# Patient Record
Sex: FEMALE | Race: WHITE | URBAN - METROPOLITAN AREA
[De-identification: names, ages, dates, MRNs, and addresses within clinical notes are randomized per-mention and may not be internally consistent; named-entity substitution may affect disease eponyms.]

---

## 2022-08-10 LAB
ALBUMIN SERPL-MCNC: 3.9 G/DL
ALP BLD-CCNC: 122 U/L
ALT SERPL-CCNC: 24 U/L
ANION GAP SERPL CALCULATED.3IONS-SCNC: NORMAL MMOL/L
AST SERPL-CCNC: 23 U/L
BASOPHILS ABSOLUTE: 76 /ΜL
BASOPHILS RELATIVE PERCENT: 0.8 %
BILIRUB SERPL-MCNC: 0.7 MG/DL (ref 0.1–1.4)
BILIRUBIN, URINE: NEGATIVE
BILIRUBIN, URINE: NORMAL
BLOOD, URINE: NORMAL
BLOOD, URINE: POSITIVE
BUN BLDV-MCNC: 12 MG/DL
CALCIUM SERPL-MCNC: 9 MG/DL
CHLORIDE BLD-SCNC: 105 MMOL/L
CHOLESTEROL, TOTAL: 169 MG/DL
CHOLESTEROL/HDL RATIO: 4
CLARITY: CLEAR
CLARITY: NORMAL
CO2: 23 MMOL/L
COLOR: NORMAL
COLOR: YELLOW
CREAT SERPL-MCNC: 0.62 MG/DL
EOSINOPHILS ABSOLUTE: 228 /ΜL
EOSINOPHILS RELATIVE PERCENT: 2.4 %
GFR CALCULATED: 113
GLUCOSE BLD-MCNC: 118 MG/DL
GLUCOSE URINE: ABNORMAL
GLUCOSE URINE: NORMAL
HCT VFR BLD CALC: 29 % (ref 36–46)
HDLC SERPL-MCNC: 42 MG/DL (ref 35–70)
HEMOGLOBIN: 7.2 G/DL (ref 12–16)
KETONES, URINE: NEGATIVE
KETONES, URINE: NORMAL
LDL CHOLESTEROL CALCULATED: 108 MG/DL (ref 0–160)
LEUKOCYTE ESTERASE, URINE: NEGATIVE
LEUKOCYTE ESTERASE, URINE: NORMAL
LYMPHOCYTES ABSOLUTE: 2708 /ΜL
LYMPHOCYTES RELATIVE PERCENT: 28.5 %
MCH RBC QN AUTO: 16.4 PG
MCHC RBC AUTO-ENTMCNC: 24.8 G/DL
MCV RBC AUTO: 66.1 FL
MONOCYTES ABSOLUTE: 580 /ΜL
MONOCYTES RELATIVE PERCENT: 6.1 %
NEUTROPHILS ABSOLUTE: 5909 /ΜL
NEUTROPHILS RELATIVE PERCENT: 62.2 %
NITRITE, URINE: NEGATIVE
NITRITE, URINE: NORMAL
NONHDLC SERPL-MCNC: 127 MG/DL
PH UA: 5.5 (ref 4.5–8)
PH UA: NORMAL
PLATELET # BLD: 279 K/ΜL
PMV BLD AUTO: 20.4 FL
POTASSIUM SERPL-SCNC: 4 MMOL/L
PROTEIN UA: ABNORMAL
PROTEIN UA: NORMAL
RBC # BLD: 4.39 10^6/ΜL
SARS-COV-2: <1
SODIUM BLD-SCNC: 138 MMOL/L
SPECIFIC GRAVITY, URINE: 1.02
SPECIFIC GRAVITY, URINE: NORMAL
TOTAL PROTEIN: 6.8
TRIGL SERPL-MCNC: 101 MG/DL
TSH SERPL DL<=0.05 MIU/L-ACNC: 12.31 UIU/ML
UROBILINOGEN, URINE: ABNORMAL
UROBILINOGEN, URINE: NORMAL
VLDLC SERPL CALC-MCNC: NORMAL MG/DL
WBC # BLD: 9.5 10^3/ML

## 2022-08-29 ENCOUNTER — OFFICE VISIT (OUTPATIENT)
Dept: PRIMARY CARE CLINIC | Age: 42
End: 2022-08-29
Payer: COMMERCIAL

## 2022-08-29 VITALS
HEART RATE: 100 BPM | BODY MASS INDEX: 44.41 KG/M2 | SYSTOLIC BLOOD PRESSURE: 124 MMHG | OXYGEN SATURATION: 99 % | HEIGHT: 68 IN | DIASTOLIC BLOOD PRESSURE: 86 MMHG | WEIGHT: 293 LBS

## 2022-08-29 DIAGNOSIS — Z98.84 HX OF GASTRIC BYPASS: ICD-10-CM

## 2022-08-29 DIAGNOSIS — E66.01 CLASS 3 SEVERE OBESITY DUE TO EXCESS CALORIES WITH SERIOUS COMORBIDITY AND BODY MASS INDEX (BMI) OF 50.0 TO 59.9 IN ADULT (HCC): ICD-10-CM

## 2022-08-29 DIAGNOSIS — N92.1 MENORRHAGIA WITH IRREGULAR CYCLE: Primary | ICD-10-CM

## 2022-08-29 DIAGNOSIS — E03.9 ACQUIRED HYPOTHYROIDISM: ICD-10-CM

## 2022-08-29 DIAGNOSIS — R22.43 LOCALIZED SWELLING OF BOTH LOWER LEGS: ICD-10-CM

## 2022-08-29 PROCEDURE — 99203 OFFICE O/P NEW LOW 30 MIN: CPT | Performed by: NURSE PRACTITIONER

## 2022-08-29 PROCEDURE — 36415 COLL VENOUS BLD VENIPUNCTURE: CPT | Performed by: NURSE PRACTITIONER

## 2022-08-29 RX ORDER — POTASSIUM CHLORIDE 750 MG/1
10 TABLET, EXTENDED RELEASE ORAL DAILY
Qty: 90 TABLET | Refills: 1 | Status: SHIPPED | OUTPATIENT
Start: 2022-08-29

## 2022-08-29 RX ORDER — LEVOTHYROXINE SODIUM 0.1 MG/1
100 TABLET ORAL DAILY
COMMUNITY
End: 2022-08-30

## 2022-08-29 RX ORDER — FERROUS SULFATE 325(65) MG
325 TABLET ORAL
COMMUNITY

## 2022-08-29 RX ORDER — FUROSEMIDE 20 MG/1
20 TABLET ORAL DAILY
Qty: 90 TABLET | Refills: 0 | Status: SHIPPED | OUTPATIENT
Start: 2022-08-29

## 2022-08-29 SDOH — ECONOMIC STABILITY: FOOD INSECURITY: WITHIN THE PAST 12 MONTHS, YOU WORRIED THAT YOUR FOOD WOULD RUN OUT BEFORE YOU GOT MONEY TO BUY MORE.: NEVER TRUE

## 2022-08-29 SDOH — HEALTH STABILITY: PHYSICAL HEALTH: ON AVERAGE, HOW MANY DAYS PER WEEK DO YOU ENGAGE IN MODERATE TO STRENUOUS EXERCISE (LIKE A BRISK WALK)?: 0 DAYS

## 2022-08-29 SDOH — ECONOMIC STABILITY: FOOD INSECURITY: WITHIN THE PAST 12 MONTHS, THE FOOD YOU BOUGHT JUST DIDN'T LAST AND YOU DIDN'T HAVE MONEY TO GET MORE.: NEVER TRUE

## 2022-08-29 ASSESSMENT — PATIENT HEALTH QUESTIONNAIRE - PHQ9
8. MOVING OR SPEAKING SO SLOWLY THAT OTHER PEOPLE COULD HAVE NOTICED. OR THE OPPOSITE, BEING SO FIGETY OR RESTLESS THAT YOU HAVE BEEN MOVING AROUND A LOT MORE THAN USUAL: 0
SUM OF ALL RESPONSES TO PHQ9 QUESTIONS 1 & 2: 2
SUM OF ALL RESPONSES TO PHQ QUESTIONS 1-9: 12
2. FEELING DOWN, DEPRESSED OR HOPELESS: 1
5. POOR APPETITE OR OVEREATING: 3
SUM OF ALL RESPONSES TO PHQ QUESTIONS 1-9: 12
4. FEELING TIRED OR HAVING LITTLE ENERGY: 3
1. LITTLE INTEREST OR PLEASURE IN DOING THINGS: 1
SUM OF ALL RESPONSES TO PHQ QUESTIONS 1-9: 12
3. TROUBLE FALLING OR STAYING ASLEEP: 2
9. THOUGHTS THAT YOU WOULD BE BETTER OFF DEAD, OR OF HURTING YOURSELF: 0
10. IF YOU CHECKED OFF ANY PROBLEMS, HOW DIFFICULT HAVE THESE PROBLEMS MADE IT FOR YOU TO DO YOUR WORK, TAKE CARE OF THINGS AT HOME, OR GET ALONG WITH OTHER PEOPLE: 3
7. TROUBLE CONCENTRATING ON THINGS, SUCH AS READING THE NEWSPAPER OR WATCHING TELEVISION: 2
6. FEELING BAD ABOUT YOURSELF - OR THAT YOU ARE A FAILURE OR HAVE LET YOURSELF OR YOUR FAMILY DOWN: 0
SUM OF ALL RESPONSES TO PHQ QUESTIONS 1-9: 12

## 2022-08-29 ASSESSMENT — SOCIAL DETERMINANTS OF HEALTH (SDOH)
WITHIN THE LAST YEAR, HAVE YOU BEEN AFRAID OF YOUR PARTNER OR EX-PARTNER?: NO
HOW HARD IS IT FOR YOU TO PAY FOR THE VERY BASICS LIKE FOOD, HOUSING, MEDICAL CARE, AND HEATING?: NOT HARD AT ALL
WITHIN THE LAST YEAR, HAVE TO BEEN RAPED OR FORCED TO HAVE ANY KIND OF SEXUAL ACTIVITY BY YOUR PARTNER OR EX-PARTNER?: NO
WITHIN THE LAST YEAR, HAVE YOU BEEN KICKED, HIT, SLAPPED, OR OTHERWISE PHYSICALLY HURT BY YOUR PARTNER OR EX-PARTNER?: NO
WITHIN THE LAST YEAR, HAVE YOU BEEN HUMILIATED OR EMOTIONALLY ABUSED IN OTHER WAYS BY YOUR PARTNER OR EX-PARTNER?: NO

## 2022-08-29 ASSESSMENT — ENCOUNTER SYMPTOMS
SHORTNESS OF BREATH: 1
COUGH: 0
WHEEZING: 0

## 2022-08-29 NOTE — PROGRESS NOTES
PROGRESS NOTE  Date of Service:  8/29/2022  Address: [place] [address] [facility]   [address], [address]  Dept: [phone]  Loc: [phone]    Subjective:      Patient ID: [id number]  Dmitry Edwards is a 38 y.o. female    HPI: patient is her to establish care, patient is  she works for [employer]. 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 Patient has 4 kids, patient has twins, 15, 6, 8. Patient was treated at [facility] for anemia, patient has had this in the past, patient does have heavy periods. Patient said that she thought she had covid. Patient has been having on going shortness of breath, at least amonth, patient was concerned she had covid. She had negative, she said she does not matter what she does. Patient does feel swollen. Patient is not eating out, she does not have appetite. Patient has echo done in 2019. Patient said that it was ok will get records, 2014 it was normal.     Review of Systems   Constitutional:  Negative for chills, fatigue and fever. Respiratory:  Positive for shortness of breath. Negative for cough and wheezing. All other systems reviewed and are negative. Objective:   Physical Exam  Vitals reviewed. Constitutional:       Appearance: Normal appearance. Cardiovascular:      Rate and Rhythm: Normal rate and regular rhythm. Pulses: Normal pulses. Heart sounds: Normal heart sounds. Pulmonary:      Effort: Pulmonary effort is normal.      Breath sounds: Normal breath sounds. Skin:     Capillary Refill: Capillary refill takes less than 2 seconds. Neurological:      General: No focal deficit present. Mental Status: She is alert and oriented to person, place, and time. Psychiatric:         Mood and Affect: Mood normal.         Behavior: Behavior normal.         Thought Content:  Thought content normal.         Judgment: Judgment normal.       Plan:   1. Menorrhagia with irregular cycle patient been having irregular periods. Patient says she is never had regular periods  -     AFL - Felisha Mirza MD, Gynecology, [facility]  2. Acquired hypothyroidism patient has history of hypothyroid we will check thyroid levels today.  -     TSH with Reflex  3. Hx of gastric bypass patient has a history of gastric bypass has not had vitamin levels checked will check today. -     Vitamin D 25 Hydroxy  4. Class 3 severe obesity due to excess calories with serious comorbidity and body mass index (BMI) of 50.0 to 59.9 in [facility]) patient is due for A1c and CMP. We will check these levels. We will talk to patient next office visit about counting calories. -     Hemoglobin A1C  -     Comprehensive Metabolic Panel  5. Localized swelling of both lower legs patient's been having swelling in both legs we will try Lasix we will get blood work before her starting Lasix patient agrees with plan. -     furosemide (LASIX) 20 MG tablet; Take 1 tablet by mouth daily, Disp-90 tablet, R-0Normal  -     potassium chloride (KLOR-CON M) 10 MEQ extended release tablet; Take 1 tablet by mouth daily, Disp-90 tablet, R-1Normal           Electronically signed by GRAEME Hamilton CNP on 8/29/22 at 3:00 PM EDT     This dictation was generated by voice recognition computer software. Although all attempts are made to edit the dictation for accuracy, there may be errors in the transcription that were not intended.

## 2022-08-30 DIAGNOSIS — E55.9 VITAMIN D DEFICIENCY: ICD-10-CM

## 2022-08-30 DIAGNOSIS — E03.9 ACQUIRED HYPOTHYROIDISM: Primary | ICD-10-CM

## 2022-08-30 LAB
A/G RATIO: 1.5 (ref 1.1–2.2)
ALBUMIN SERPL-MCNC: 4.2 G/DL (ref 3.4–5)
ALP BLD-CCNC: 144 U/L (ref 40–129)
ALT SERPL-CCNC: 28 U/L (ref 10–40)
ANION GAP SERPL CALCULATED.3IONS-SCNC: 15 MMOL/L (ref 3–16)
AST SERPL-CCNC: 30 U/L (ref 15–37)
BILIRUB SERPL-MCNC: 0.6 MG/DL (ref 0–1)
BUN BLDV-MCNC: 8 MG/DL (ref 7–20)
CALCIUM SERPL-MCNC: 8.7 MG/DL (ref 8.3–10.6)
CHLORIDE BLD-SCNC: 106 MMOL/L (ref 99–110)
CO2: 21 MMOL/L (ref 21–32)
CREAT SERPL-MCNC: 0.7 MG/DL (ref 0.6–1.1)
ESTIMATED AVERAGE GLUCOSE: 99.7 MG/DL
GFR AFRICAN AMERICAN: >60
GFR NON-AFRICAN AMERICAN: >60
GLUCOSE BLD-MCNC: 98 MG/DL (ref 70–99)
HBA1C MFR BLD: 5.1 %
POTASSIUM SERPL-SCNC: 4.3 MMOL/L (ref 3.5–5.1)
SODIUM BLD-SCNC: 142 MMOL/L (ref 136–145)
T4 FREE: 1 NG/DL (ref 0.9–1.8)
TOTAL PROTEIN: 7 G/DL (ref 6.4–8.2)
TSH REFLEX: 13.35 UIU/ML (ref 0.27–4.2)
VITAMIN D 25-HYDROXY: 15 NG/ML

## 2022-08-30 RX ORDER — LEVOTHYROXINE SODIUM 137 UG/1
TABLET ORAL
Qty: 180 TABLET | Refills: 0 | Status: SHIPPED | OUTPATIENT
Start: 2022-08-30 | End: 2022-10-05

## 2022-08-30 RX ORDER — ERGOCALCIFEROL 1.25 MG/1
50000 CAPSULE ORAL WEEKLY
Qty: 12 CAPSULE | Refills: 1 | Status: SHIPPED | OUTPATIENT
Start: 2022-08-30

## 2022-09-26 ENCOUNTER — OFFICE VISIT (OUTPATIENT)
Dept: PRIMARY CARE CLINIC | Age: 42
End: 2022-09-26
Payer: COMMERCIAL

## 2022-09-26 VITALS
TEMPERATURE: 98 F | RESPIRATION RATE: 18 BRPM | WEIGHT: 293 LBS | HEART RATE: 86 BPM | SYSTOLIC BLOOD PRESSURE: 116 MMHG | OXYGEN SATURATION: 98 % | BODY MASS INDEX: 44.41 KG/M2 | DIASTOLIC BLOOD PRESSURE: 82 MMHG | HEIGHT: 68 IN

## 2022-09-26 DIAGNOSIS — D50.0 IRON DEFICIENCY ANEMIA DUE TO CHRONIC BLOOD LOSS: Primary | ICD-10-CM

## 2022-09-26 DIAGNOSIS — E03.9 ACQUIRED HYPOTHYROIDISM: ICD-10-CM

## 2022-09-26 DIAGNOSIS — R60.0 BILATERAL LEG EDEMA: ICD-10-CM

## 2022-09-26 DIAGNOSIS — E55.9 VITAMIN D DEFICIENCY: ICD-10-CM

## 2022-09-26 DIAGNOSIS — Z98.84 HX OF GASTRIC BYPASS: ICD-10-CM

## 2022-09-26 DIAGNOSIS — E66.01 CLASS 3 SEVERE OBESITY DUE TO EXCESS CALORIES WITH SERIOUS COMORBIDITY AND BODY MASS INDEX (BMI) OF 50.0 TO 59.9 IN ADULT (HCC): ICD-10-CM

## 2022-09-26 LAB
ANION GAP SERPL CALCULATED.3IONS-SCNC: 16 MMOL/L (ref 3–16)
BUN BLDV-MCNC: 7 MG/DL (ref 7–20)
CALCIUM SERPL-MCNC: 9.5 MG/DL (ref 8.3–10.6)
CHLORIDE BLD-SCNC: 105 MMOL/L (ref 99–110)
CO2: 21 MMOL/L (ref 21–32)
CREAT SERPL-MCNC: 0.7 MG/DL (ref 0.6–1.1)
FOLATE: 11.59 NG/ML (ref 4.78–24.2)
GFR AFRICAN AMERICAN: >60
GFR NON-AFRICAN AMERICAN: >60
GLUCOSE BLD-MCNC: 121 MG/DL (ref 70–99)
POTASSIUM SERPL-SCNC: 4.4 MMOL/L (ref 3.5–5.1)
SODIUM BLD-SCNC: 142 MMOL/L (ref 136–145)
TSH SERPL DL<=0.05 MIU/L-ACNC: 0.25 UIU/ML (ref 0.27–4.2)
VITAMIN B-12: >2000 PG/ML (ref 211–911)
VITAMIN D 25-HYDROXY: 25 NG/ML

## 2022-09-26 PROCEDURE — 99214 OFFICE O/P EST MOD 30 MIN: CPT | Performed by: NURSE PRACTITIONER

## 2022-09-26 PROCEDURE — 36415 COLL VENOUS BLD VENIPUNCTURE: CPT | Performed by: NURSE PRACTITIONER

## 2022-09-26 ASSESSMENT — LIFESTYLE VARIABLES
HOW MANY STANDARD DRINKS CONTAINING ALCOHOL DO YOU HAVE ON A TYPICAL DAY: 1 OR 2
HOW OFTEN DO YOU HAVE A DRINK CONTAINING ALCOHOL: MONTHLY OR LESS

## 2022-09-26 ASSESSMENT — PATIENT HEALTH QUESTIONNAIRE - PHQ9
SUM OF ALL RESPONSES TO PHQ9 QUESTIONS 1 & 2: 0
SUM OF ALL RESPONSES TO PHQ QUESTIONS 1-9: 0
1. LITTLE INTEREST OR PLEASURE IN DOING THINGS: 0
SUM OF ALL RESPONSES TO PHQ QUESTIONS 1-9: 0
2. FEELING DOWN, DEPRESSED OR HOPELESS: 0

## 2022-09-26 ASSESSMENT — ENCOUNTER SYMPTOMS
DIARRHEA: 0
SHORTNESS OF BREATH: 0
WHEEZING: 0
BLOOD IN STOOL: 0
NAUSEA: 0
CONSTIPATION: 0
COUGH: 0
VOMITING: 0

## 2022-09-26 NOTE — PROGRESS NOTES
Vitamin B12 & Folate  -     CBC with Auto Differential  -     Celiac Disease Panel  2. Bilateral leg edema patient's leg edema has improved with Lasix. Patient will do for 1 more month and then stop and see how her leg edema is doing patient can increase her physical activity to help with venous insufficiency. -     Basic Metabolic Panel  3. Vitamin D deficiency patient is a history of vitamin D deficiency we will check labs. -     Vitamin D 25 Hydroxy  4. Hx of gastric bypass patient had gastric bypass surgery we will check B12.  -     Vitamin B12 & Folate  5. Class 3 severe obesity due to excess calories with serious comorbidity and body mass index (BMI) of 50.0 to 59.9 in adult (HCC)-we will discuss with patient next office visit about calorie counting. Weight loss would improve patient's swelling in her legs. 6. Acquired hypothyroidism patient is a history of hypothyroid we will check levels patient was off last time.  -     TSH           Electronically signed by GRAEME Barker CNP on 9/26/22 at 11:38 AM EDT     This dictation was generated by voice recognition computer software. Although all attempts are made to edit the dictation for accuracy, there may be errors in the transcription that were not intended.

## 2022-09-26 NOTE — PATIENT INSTRUCTIONS
[address]  Flavio Waters MD   [address], [address] [place] [address]   Phone: [phone] Fax: [phone]

## 2022-09-27 LAB
IGA: 160 MG/DL (ref 70–400)
TISSUE TRANSGLUTAMINASE IGA: 0.7 U/ML (ref 0–14)

## 2022-09-28 DIAGNOSIS — D50.0 IRON DEFICIENCY ANEMIA DUE TO CHRONIC BLOOD LOSS: ICD-10-CM

## 2022-09-28 DIAGNOSIS — E03.9 ACQUIRED HYPOTHYROIDISM: Primary | ICD-10-CM

## 2022-09-28 LAB
IRON SATURATION: 10 % (ref 15–50)
IRON: 34 UG/DL (ref 37–145)
TOTAL IRON BINDING CAPACITY: 341 UG/DL (ref 260–445)

## 2022-10-05 ENCOUNTER — TELEPHONE (OUTPATIENT)
Dept: PRIMARY CARE CLINIC | Age: 42
End: 2022-10-05

## 2022-10-05 RX ORDER — LEVOTHYROXINE SODIUM 0.07 MG/1
225 TABLET ORAL DAILY
Qty: 90 TABLET | Refills: 0 | Status: SHIPPED | OUTPATIENT
Start: 2022-10-05

## 2022-10-05 NOTE — TELEPHONE ENCOUNTER
Sent in 75 mg tablets for patient and she will take 3 of these.   She will reach get it rechecked again in 4 weeks

## 2022-10-05 NOTE — TELEPHONE ENCOUNTER
Patient called due to thyroid being hyperactive, new dosing of synthroid needs to be sent per results note. Last dosing of 137mcg BID sent 08/30. Routing to [employer].

## 2022-11-09 ENCOUNTER — NURSE ONLY (OUTPATIENT)
Dept: PRIMARY CARE CLINIC | Age: 42
End: 2022-11-09
Payer: COMMERCIAL

## 2022-11-09 VITALS — TEMPERATURE: 98 F

## 2022-11-09 DIAGNOSIS — D50.0 IRON DEFICIENCY ANEMIA DUE TO CHRONIC BLOOD LOSS: ICD-10-CM

## 2022-11-09 DIAGNOSIS — E03.9 ACQUIRED HYPOTHYROIDISM: ICD-10-CM

## 2022-11-09 PROCEDURE — 36415 COLL VENOUS BLD VENIPUNCTURE: CPT | Performed by: NURSE PRACTITIONER

## 2022-11-10 ENCOUNTER — TELEPHONE (OUTPATIENT)
Dept: PRIMARY CARE CLINIC | Age: 42
End: 2022-11-10

## 2022-11-10 DIAGNOSIS — D50.0 IRON DEFICIENCY ANEMIA DUE TO CHRONIC BLOOD LOSS: Primary | ICD-10-CM

## 2022-11-10 LAB
REASON FOR REJECTION: NORMAL
REJECTED TEST: NORMAL
TSH SERPL DL<=0.05 MIU/L-ACNC: 1.04 UIU/ML (ref 0.27–4.2)

## 2022-11-14 RX ORDER — LEVOTHYROXINE SODIUM 0.07 MG/1
225 TABLET ORAL DAILY
Qty: 90 TABLET | Refills: 0 | Status: SHIPPED | OUTPATIENT
Start: 2022-11-14

## 2023-01-05 DIAGNOSIS — R22.43 LOCALIZED SWELLING OF BOTH LOWER LEGS: ICD-10-CM

## 2023-01-05 RX ORDER — LEVOTHYROXINE SODIUM 0.07 MG/1
225 TABLET ORAL DAILY
Qty: 90 TABLET | Refills: 0 | Status: SHIPPED | OUTPATIENT
Start: 2023-01-05

## 2023-01-05 RX ORDER — FUROSEMIDE 20 MG/1
20 TABLET ORAL DAILY
Qty: 90 TABLET | Refills: 0 | Status: SHIPPED | OUTPATIENT
Start: 2023-01-05

## 2023-01-05 NOTE — TELEPHONE ENCOUNTER
Medication:   Requested Prescriptions     Pending Prescriptions Disp Refills    furosemide (LASIX) 20 MG tablet [Pharmacy Med Name: FUROSEMIDE 20MG TABLETS] 90 tablet 0     Sig: TAKE 1 TABLET BY MOUTH DAILY    levothyroxine (SYNTHROID) 75 MCG tablet [Pharmacy Med Name: LEVOTHYROXINE 0.075MG (75MCG) TABS] 90 tablet 0     Sig: TAKE 3 TABLETS BY MOUTH DAILY        Last [id number]      Patient Phone Number: [phone] (home)     Last appt: 9/26/2022   Next appt: Visit date not found    Last OARRS: No flowsheet data found.

## 2023-01-06 NOTE — TELEPHONE ENCOUNTER
LM for patient to call for appointment per [facility]- letter sent in mail as well.

## 2023-05-11 RX ORDER — LEVOTHYROXINE SODIUM 0.07 MG/1
225 TABLET ORAL DAILY
Qty: 30 TABLET | Refills: 0 | Status: SHIPPED | OUTPATIENT
Start: 2023-05-11

## 2023-06-29 ENCOUNTER — TELEPHONE (OUTPATIENT)
Dept: PRIMARY CARE CLINIC | Age: 43
End: 2023-06-29

## 2023-06-29 ENCOUNTER — TELEMEDICINE (OUTPATIENT)
Dept: PRIMARY CARE CLINIC | Age: 43
End: 2023-06-29
Payer: COMMERCIAL

## 2023-06-29 DIAGNOSIS — U07.1 COVID-19: Primary | ICD-10-CM

## 2023-06-29 PROCEDURE — 99213 OFFICE O/P EST LOW 20 MIN: CPT | Performed by: NURSE PRACTITIONER

## 2023-06-29 RX ORDER — NIRMATRELVIR AND RITONAVIR 150-100 MG
KIT ORAL
Qty: 20 TABLET | Refills: 0 | Status: SHIPPED | OUTPATIENT
Start: 2023-06-29 | End: 2023-06-29

## 2023-06-29 SDOH — ECONOMIC STABILITY: INCOME INSECURITY: HOW HARD IS IT FOR YOU TO PAY FOR THE VERY BASICS LIKE FOOD, HOUSING, MEDICAL CARE, AND HEATING?: NOT HARD AT ALL

## 2023-06-29 SDOH — ECONOMIC STABILITY: FOOD INSECURITY: WITHIN THE PAST 12 MONTHS, YOU WORRIED THAT YOUR FOOD WOULD RUN OUT BEFORE YOU GOT MONEY TO BUY MORE.: NEVER TRUE

## 2023-06-29 SDOH — ECONOMIC STABILITY: FOOD INSECURITY: WITHIN THE PAST 12 MONTHS, THE FOOD YOU BOUGHT JUST DIDN'T LAST AND YOU DIDN'T HAVE MONEY TO GET MORE.: NEVER TRUE

## 2023-06-29 SDOH — ECONOMIC STABILITY: HOUSING INSECURITY
IN THE LAST 12 MONTHS, WAS THERE A TIME WHEN YOU DID NOT HAVE A STEADY PLACE TO SLEEP OR SLEPT IN A SHELTER (INCLUDING NOW)?: NO

## 2023-06-29 ASSESSMENT — PATIENT HEALTH QUESTIONNAIRE - PHQ9
SUM OF ALL RESPONSES TO PHQ9 QUESTIONS 1 & 2: 0
SUM OF ALL RESPONSES TO PHQ QUESTIONS 1-9: 0
SUM OF ALL RESPONSES TO PHQ QUESTIONS 1-9: 0
2. FEELING DOWN, DEPRESSED OR HOPELESS: 0
1. LITTLE INTEREST OR PLEASURE IN DOING THINGS: 0
SUM OF ALL RESPONSES TO PHQ QUESTIONS 1-9: 0
SUM OF ALL RESPONSES TO PHQ QUESTIONS 1-9: 0

## 2023-06-29 ASSESSMENT — ENCOUNTER SYMPTOMS
WHEEZING: 0
SHORTNESS OF BREATH: 1
COUGH: 1

## 2023-09-08 DIAGNOSIS — R22.43 LOCALIZED SWELLING OF BOTH LOWER LEGS: ICD-10-CM

## 2023-09-08 RX ORDER — POTASSIUM CHLORIDE 750 MG/1
10 TABLET, EXTENDED RELEASE ORAL DAILY
Qty: 90 TABLET | Refills: 1 | OUTPATIENT
Start: 2023-09-08

## 2023-09-08 NOTE — TELEPHONE ENCOUNTER
Medication:   Requested Prescriptions     Pending Prescriptions Disp Refills    potassium chloride (KLOR-CON M) 10 MEQ extended release tablet [Pharmacy Med Name: POTASSIUM CL MICRO 10MEQ ER TABS] 90 tablet 1     Sig: TAKE 1 TABLET BY MOUTH DAILY        Last Filled:  8/29/2022 *d/c'd on 6/29/2023*    Patient Phone Number: [phone] (home)     Last appt: 9/26/2022 Return in about 3 months (around 12/26/2022) for legs swelling and anemia . Next appt: Visit date not found    Last OARRS: No flowsheet data found.

## 2024-01-29 ENCOUNTER — TELEPHONE (OUTPATIENT)
Dept: PRIMARY CARE CLINIC | Age: 44
End: 2024-01-29

## 2024-01-29 NOTE — TELEPHONE ENCOUNTER
Lab Orders Requested  In advance of scheduled OV 2/2/2024  *Please follow up with patient if/when Orders are available

## 2024-01-29 NOTE — TELEPHONE ENCOUNTER
Since patient is in the afternoon yes I can order labs but let her know if something comes up in the appointment might need to get them done again. Or she can wait until after appointment to get them?

## 2024-02-02 ENCOUNTER — OFFICE VISIT (OUTPATIENT)
Dept: PRIMARY CARE CLINIC | Age: 44
End: 2024-02-02
Payer: COMMERCIAL

## 2024-02-02 VITALS
DIASTOLIC BLOOD PRESSURE: 86 MMHG | BODY MASS INDEX: 44.41 KG/M2 | OXYGEN SATURATION: 100 % | HEART RATE: 95 BPM | HEIGHT: 68 IN | RESPIRATION RATE: 18 BRPM | SYSTOLIC BLOOD PRESSURE: 132 MMHG | TEMPERATURE: 97.4 F | WEIGHT: 293 LBS

## 2024-02-02 DIAGNOSIS — R22.32 LOCALIZED SWELLING OF BOTH HANDS: ICD-10-CM

## 2024-02-02 DIAGNOSIS — D50.0 IRON DEFICIENCY ANEMIA DUE TO CHRONIC BLOOD LOSS: ICD-10-CM

## 2024-02-02 DIAGNOSIS — R06.01 ORTHOPNEA: ICD-10-CM

## 2024-02-02 DIAGNOSIS — E03.9 ACQUIRED HYPOTHYROIDISM: ICD-10-CM

## 2024-02-02 DIAGNOSIS — R22.31 LOCALIZED SWELLING OF BOTH HANDS: ICD-10-CM

## 2024-02-02 DIAGNOSIS — M79.89 LEG SWELLING: Primary | ICD-10-CM

## 2024-02-02 DIAGNOSIS — E55.9 VITAMIN D DEFICIENCY: ICD-10-CM

## 2024-02-02 DIAGNOSIS — R06.02 SOB (SHORTNESS OF BREATH) ON EXERTION: ICD-10-CM

## 2024-02-02 PROCEDURE — 99214 OFFICE O/P EST MOD 30 MIN: CPT | Performed by: NURSE PRACTITIONER

## 2024-02-02 RX ORDER — LEVOTHYROXINE SODIUM 0.03 MG/1
25 TABLET ORAL DAILY
Qty: 90 TABLET | Refills: 1 | Status: SHIPPED | OUTPATIENT
Start: 2024-02-02

## 2024-02-02 RX ORDER — FUROSEMIDE 20 MG/1
20 TABLET ORAL DAILY
Qty: 90 TABLET | Refills: 1 | Status: SHIPPED | OUTPATIENT
Start: 2024-02-02

## 2024-02-02 RX ORDER — POTASSIUM CHLORIDE 20 MEQ/1
20 TABLET, EXTENDED RELEASE ORAL DAILY
Qty: 90 TABLET | Refills: 1 | Status: SHIPPED | OUTPATIENT
Start: 2024-02-02

## 2024-02-02 ASSESSMENT — PATIENT HEALTH QUESTIONNAIRE - PHQ9
2. FEELING DOWN, DEPRESSED OR HOPELESS: SEVERAL DAYS
7. TROUBLE CONCENTRATING ON THINGS, SUCH AS READING THE NEWSPAPER OR WATCHING TELEVISION: 1
SUM OF ALL RESPONSES TO PHQ QUESTIONS 1-9: 6
4. FEELING TIRED OR HAVING LITTLE ENERGY: 1
1. LITTLE INTEREST OR PLEASURE IN DOING THINGS: SEVERAL DAYS
10. IF YOU CHECKED OFF ANY PROBLEMS, HOW DIFFICULT HAVE THESE PROBLEMS MADE IT FOR YOU TO DO YOUR WORK, TAKE CARE OF THINGS AT HOME, OR GET ALONG WITH OTHER PEOPLE: 1
SUM OF ALL RESPONSES TO PHQ9 QUESTIONS 1 & 2: 2
1. LITTLE INTEREST OR PLEASURE IN DOING THINGS: 1
SUM OF ALL RESPONSES TO PHQ QUESTIONS 1-9: 6
6. FEELING BAD ABOUT YOURSELF - OR THAT YOU ARE A FAILURE OR HAVE LET YOURSELF OR YOUR FAMILY DOWN: 0
SUM OF ALL RESPONSES TO PHQ9 QUESTIONS 1 & 2: 2
SUM OF ALL RESPONSES TO PHQ QUESTIONS 1-9: 6
9. THOUGHTS THAT YOU WOULD BE BETTER OFF DEAD, OR OF HURTING YOURSELF: 0
3. TROUBLE FALLING OR STAYING ASLEEP: 1
5. POOR APPETITE OR OVEREATING: 1
8. MOVING OR SPEAKING SO SLOWLY THAT OTHER PEOPLE COULD HAVE NOTICED. OR THE OPPOSITE, BEING SO FIGETY OR RESTLESS THAT YOU HAVE BEEN MOVING AROUND A LOT MORE THAN USUAL: 0
2. FEELING DOWN, DEPRESSED OR HOPELESS: 1
SUM OF ALL RESPONSES TO PHQ QUESTIONS 1-9: 6

## 2024-02-02 ASSESSMENT — ENCOUNTER SYMPTOMS
SHORTNESS OF BREATH: 1
COUGH: 0

## 2024-02-02 NOTE — PROGRESS NOTES
PROGRESS NOTE  Date of Service:  2/2/2024  Address[place] [facility]  [address]  [facility] [address]  Dept: [phone]  Loc: [phone]    Subjective:      Patient ID: [id number]  Comfort Armijo is a 43 y.o. female    HPI: patient is here for follow up    Patient has  had swelling in her legs, patient said the swelling depends in the day. Patient said that she does have a little better with legs, she can not breath when laying flat. Patient is having problems walking.     Patient has not had a period in 3 months, she said they were really heavy. Patient said she can not do a flight of stairs, 8 months ago she could. She said that she has been having marital problems, she is in process of separations.     Review of Systems   Constitutional:  Positive for fatigue. Negative for chills.   Respiratory:  Positive for shortness of breath. Negative for cough.    All other systems reviewed and are negative.    Objective:   Physical Exam  Vitals reviewed.   Constitutional:       Appearance: Normal appearance. She is obese.   Cardiovascular:      Rate and Rhythm: Normal rate and regular rhythm.      Pulses: Normal pulses.      Heart sounds: Normal heart sounds.   Pulmonary:      Effort: Pulmonary effort is normal.      Breath sounds: Normal breath sounds.   Abdominal:      General: Abdomen is flat.   Feet:      Comments: Bilateral legs swelling 3 + pitting edema   Skin:     Capillary Refill: Capillary refill takes less than 2 seconds.   Neurological:      General: No focal deficit present.      Mental Status: She is alert and oriented to person, place, and time.   Psychiatric:         Mood and Affect: Mood normal.         Behavior: Behavior normal.         Thought Content: Thought content normal.         Judgment: Judgment normal.         Plan:   1. Leg swelling very concerned for patient swelling or shortness of breath will get BNP along with CMP patient

## 2024-02-03 LAB
25(OH)D3 SERPL-MCNC: 10.4 NG/ML
ALBUMIN SERPL-MCNC: 4.6 G/DL (ref 3.4–5)
ALBUMIN/GLOB SERPL: 1.7 {RATIO} (ref 1.1–2.2)
ALP SERPL-CCNC: 124 U/L (ref 40–129)
ALT SERPL-CCNC: 26 U/L (ref 10–40)
ANION GAP SERPL CALCULATED.3IONS-SCNC: 18 MMOL/L (ref 3–16)
AST SERPL-CCNC: 29 U/L (ref 15–37)
BILIRUB SERPL-MCNC: 0.5 MG/DL (ref 0–1)
BUN SERPL-MCNC: 8 MG/DL (ref 7–20)
CALCIUM SERPL-MCNC: 8.9 MG/DL (ref 8.3–10.6)
CHLORIDE SERPL-SCNC: 99 MMOL/L (ref 99–110)
CO2 SERPL-SCNC: 21 MMOL/L (ref 21–32)
CREAT SERPL-MCNC: 0.7 MG/DL (ref 0.6–1.1)
FOLATE SERPL-MCNC: 9.11 NG/ML (ref 4.78–24.2)
GFR SERPLBLD CREATININE-BSD FMLA CKD-EPI: >60 ML/MIN/{1.73_M2}
GLUCOSE SERPL-MCNC: 115 MG/DL (ref 70–99)
IRON SATN MFR SERPL: 5 % (ref 15–50)
IRON SERPL-MCNC: 32 UG/DL (ref 37–145)
NT-PROBNP SERPL-MCNC: 65 PG/ML (ref 0–124)
POTASSIUM SERPL-SCNC: 4.7 MMOL/L (ref 3.5–5.1)
PROT SERPL-MCNC: 7.3 G/DL (ref 6.4–8.2)
SODIUM SERPL-SCNC: 138 MMOL/L (ref 136–145)
T4 FREE SERPL-MCNC: 0.2 NG/DL (ref 0.9–1.8)
TIBC SERPL-MCNC: 584 UG/DL (ref 260–445)
TSH SERPL DL<=0.005 MIU/L-ACNC: 219.5 UIU/ML (ref 0.27–4.2)
VIT B12 SERPL-MCNC: 475 PG/ML (ref 211–911)

## 2024-02-04 RX ORDER — ERGOCALCIFEROL 1.25 MG/1
50000 CAPSULE ORAL WEEKLY
Qty: 12 CAPSULE | Refills: 1 | Status: SHIPPED | OUTPATIENT
Start: 2024-02-04

## 2024-02-07 ENCOUNTER — HOSPITAL ENCOUNTER (OUTPATIENT)
Dept: NON INVASIVE DIAGNOSTICS | Age: 44
Discharge: HOME | End: 2024-02-07
Payer: COMMERCIAL

## 2024-02-07 DIAGNOSIS — M79.89 LEG SWELLING: ICD-10-CM

## 2024-02-07 PROCEDURE — 6360000004 HC RX CONTRAST MEDICATION: Performed by: NURSE PRACTITIONER

## 2024-02-07 PROCEDURE — C8929 TTE W OR WO FOL WCON,DOPPLER: HCPCS

## 2024-02-07 RX ADMIN — PERFLUTREN 1.5 ML: 6.52 INJECTION, SUSPENSION INTRAVENOUS at 02:35

## 2024-02-28 ENCOUNTER — OFFICE VISIT (OUTPATIENT)
Dept: PRIMARY CARE CLINIC | Age: 44
End: 2024-02-28
Payer: COMMERCIAL

## 2024-02-28 VITALS
BODY MASS INDEX: 44.41 KG/M2 | WEIGHT: 293 LBS | HEIGHT: 68 IN | TEMPERATURE: 97.2 F | OXYGEN SATURATION: 100 % | HEART RATE: 99 BPM | RESPIRATION RATE: 18 BRPM | DIASTOLIC BLOOD PRESSURE: 74 MMHG | SYSTOLIC BLOOD PRESSURE: 102 MMHG

## 2024-02-28 DIAGNOSIS — E66.01 CLASS 3 SEVERE OBESITY DUE TO EXCESS CALORIES WITH SERIOUS COMORBIDITY AND BODY MASS INDEX (BMI) OF 50.0 TO 59.9 IN ADULT (HCC): ICD-10-CM

## 2024-02-28 DIAGNOSIS — R29.898 WEAKNESS OF FOOT, UNSPECIFIED LATERALITY: ICD-10-CM

## 2024-02-28 DIAGNOSIS — R06.02 SOB (SHORTNESS OF BREATH) ON EXERTION: ICD-10-CM

## 2024-02-28 DIAGNOSIS — R26.2 DIFFICULTY IN WALKING: ICD-10-CM

## 2024-02-28 DIAGNOSIS — E03.9 ACQUIRED HYPOTHYROIDISM: ICD-10-CM

## 2024-02-28 DIAGNOSIS — R06.02 SOB (SHORTNESS OF BREATH): ICD-10-CM

## 2024-02-28 DIAGNOSIS — R29.898 HAND WEAKNESS: Primary | ICD-10-CM

## 2024-02-28 PROCEDURE — 99214 OFFICE O/P EST MOD 30 MIN: CPT | Performed by: NURSE PRACTITIONER

## 2024-02-28 RX ORDER — LEVOTHYROXINE SODIUM 137 UG/1
TABLET ORAL
COMMUNITY
Start: 2024-02-22

## 2024-02-28 RX ORDER — BUDESONIDE AND FORMOTEROL FUMARATE DIHYDRATE 160; 4.5 UG/1; UG/1
2 AEROSOL RESPIRATORY (INHALATION) 2 TIMES DAILY
Qty: 1 EACH | Refills: 1 | Status: SHIPPED | OUTPATIENT
Start: 2024-02-28

## 2024-02-28 NOTE — PROGRESS NOTES
PROGRESS NOTE  Date of Service:  2/28/2024  Address[place] [facility]  [address]  [facility] [address]  Dept: [phone]  Loc: [phone]    Subjective:      Patient ID: [id number]  Comfort Armijo is a 43 y.o. female    HPI: patient is here for her hospital follow up, patient is having weakness with bilateral feet and hands. Patient is shortness of breath as well. Patient said that she  is maybe feeling better. She is supposed to go out of town for work. Patient said that she has been resting more.     Patient is having liquid stools, and stomach cramps. She has has this since Saturday.     Review of Systems   Constitutional:  Positive for fatigue. Negative for chills.   Respiratory:  Positive for shortness of breath. Negative for cough and wheezing.    Psychiatric/Behavioral:  Negative for self-injury. The patient is not nervous/anxious.    All other systems reviewed and are negative.    Objective:   Physical Exam  Vitals reviewed.   Constitutional:       Appearance: Normal appearance.   Cardiovascular:      Rate and Rhythm: Normal rate and regular rhythm.      Pulses: Normal pulses.      Heart sounds: Normal heart sounds.   Pulmonary:      Effort: Pulmonary effort is normal.      Breath sounds: Normal breath sounds.   Abdominal:      General: Abdomen is flat.   Skin:     Capillary Refill: Capillary refill takes less than 2 seconds.   Neurological:      General: No focal deficit present.      Mental Status: She is alert and oriented to person, place, and time.   Psychiatric:         Mood and Affect: Mood normal.         Behavior: Behavior normal.         Thought Content: Thought content normal.         Judgment: Judgment normal.         Plan:   1. Hand weakness patient's been having bilateral hand weakness.  Patient's not able to button close as well.  Patient is also noticing weakness in her lower legs as well.  Will refer patient to

## 2024-02-29 LAB
T4 FREE SERPL-MCNC: 1 NG/DL (ref 0.9–1.8)
TSH SERPL DL<=0.005 MIU/L-ACNC: 55.98 UIU/ML (ref 0.27–4.2)

## 2024-03-04 ENCOUNTER — TELEPHONE (OUTPATIENT)
Dept: NEUROLOGY | Age: 44
End: 2024-03-04

## 2024-03-04 ENCOUNTER — HOSPITAL ENCOUNTER (OUTPATIENT)
Dept: PULMONOLOGY | Age: 44
Discharge: HOME | End: 2024-03-04
Payer: COMMERCIAL

## 2024-03-04 VITALS — OXYGEN SATURATION: 96 % | RESPIRATION RATE: 16 BRPM | HEART RATE: 94 BPM

## 2024-03-04 DIAGNOSIS — R06.02 SOB (SHORTNESS OF BREATH) ON EXERTION: ICD-10-CM

## 2024-03-04 LAB
DLCO %PRED: 48 %
DLCO PRED: NORMAL
DLCO/VA %PRED: NORMAL
DLCO/VA PRED: NORMAL
DLCO/VA: NORMAL
DLCO: NORMAL
EXPIRATORY TIME-POST: NORMAL
EXPIRATORY TIME: NORMAL
FEF 25-75 %CHNG: NORMAL
FEF 25-75 POST %PRED: NORMAL
FEF 25-75% %PRED-PRE: NORMAL
FEF 25-75% PRED: NORMAL
FEF 25-75-POST: NORMAL
FEF 25-75-PRE: NORMAL
FEV1 %PRED-POST: 62 %
FEV1 %PRED-PRE: 56 %
FEV1 PRED: NORMAL
FEV1-POST: NORMAL
FEV1-PRE: NORMAL
FEV1/FVC %PRED-POST: NORMAL
FEV1/FVC %PRED-PRE: NORMAL
FEV1/FVC PRED: NORMAL
FEV1/FVC-POST: 94 %
FEV1/FVC-PRE: 90 %
FVC %PRED-POST: NORMAL
FVC %PRED-PRE: NORMAL
FVC PRED: NORMAL
FVC-POST: NORMAL
FVC-PRE: NORMAL
GAW %PRED: NORMAL
GAW PRED: NORMAL
GAW: NORMAL
IC PRE %PRED: NORMAL
IC PRED: NORMAL
IC: NORMAL
MEP: NORMAL
MIP: NORMAL
MVV %PRED-PRE: NORMAL
MVV PRED: NORMAL
MVV-PRE: NORMAL
PEF %PRED-POST: NORMAL
PEF %PRED-PRE: NORMAL
PEF PRED: NORMAL
PEF%CHNG: NORMAL
PEF-POST: NORMAL
PEF-PRE: NORMAL
RAW %PRED: NORMAL
RAW PRED: NORMAL
RAW: NORMAL
RV PRE %PRED: NORMAL
RV PRED: NORMAL
RV: NORMAL
SVC %PRED: NORMAL
SVC PRED: NORMAL
SVC: NORMAL
TLC PRE %PRED: 81 %
TLC PRED: NORMAL
TLC: NORMAL
VA %PRED: NORMAL
VA PRED: NORMAL
VA: NORMAL
VTG %PRED: NORMAL
VTG PRED: NORMAL
VTG: NORMAL

## 2024-03-04 PROCEDURE — 94060 EVALUATION OF WHEEZING: CPT

## 2024-03-04 PROCEDURE — 6370000000 HC RX 637 (ALT 250 FOR IP): Performed by: NURSE PRACTITIONER

## 2024-03-04 PROCEDURE — 94729 DIFFUSING CAPACITY: CPT

## 2024-03-04 PROCEDURE — 94760 N-INVAS EAR/PLS OXIMETRY 1: CPT

## 2024-03-04 PROCEDURE — 94726 PLETHYSMOGRAPHY LUNG VOLUMES: CPT

## 2024-03-04 RX ORDER — ALBUTEROL SULFATE 90 UG/1
4 AEROSOL, METERED RESPIRATORY (INHALATION) ONCE
Status: COMPLETED | OUTPATIENT
Start: 2024-03-04 | End: 2024-03-04

## 2024-03-04 RX ADMIN — Medication 4 PUFF: at 16:23

## 2024-03-04 ASSESSMENT — PULMONARY FUNCTION TESTS
FEV1/FVC_PRE: 90
FEV1_PERCENT_PREDICTED_POST: 62
FEV1/FVC_POST: 94
FEV1_PERCENT_PREDICTED_PRE: 56

## 2024-03-04 NOTE — PROGRESS NOTES
[facility]     Outpatient Cardiology         Patient Name:  Comfort Armijo  Requesting Physician: No admitting provider for patient encounter.  Primary Care Physician: Gage Burnett APRN - CNP    Reason for Consultation/Chief Complaint:   Chief Complaint   Patient presents with    Shortness of Breath    Edema     BLLE    Numbness     Hands and feet    Bloated         History of Present Illness:    HPI     Comfort Street a 43 y.o. female with PMH of hypothyroidism, hypertension, asthma, anemia.   Here to be evaluated for shortness of breath. Referred by gage Burnett NP.   Shortness of breath, ongoing for the last few weeks, recently found to be significantly hypothyroid.  Recent PFTs consistent with decreased diffusion capacity.  Recent cardiac workup including echocardiogram/BNP/EKG within normal limits.  Her edema seems to be mostly consistent with myxedema.        PMH  Past Medical History:   Diagnosis Date    Anemia     Asthma     Headache     Hypertension     Hypothyroidism     Obesity        PSH  Past Surgical History:   Procedure Laterality Date     SECTION      GASTRIC BYPASS SURGERY  2004    UPPER GASTROINTESTINAL ENDOSCOPY          Social HIstory  Social History     Tobacco Use    Smoking status: Never    Smokeless tobacco: Never   Substance Use Topics    Alcohol use: Yes     Comment: socially    Drug use: Never       Family History  Family History   Problem Relation Age of Onset    Alcohol Abuse Mother     Alcohol Abuse Father     Alcohol Abuse Brother        Allergies   Allergies   Allergen Reactions    Sulfa Antibiotics Other (See Comments)     Since a child        Medications:     Home Medications:  Were reviewed and are listed in nursing record. and/or listed below    Prior to Admission medications    Medication Sig Start Date End Date Taking? Authorizing Provider   levothyroxine (SYNTHROID) 137 MCG tablet TAKE 1 TABLET BY MOUTH TWICE DAILY FOR

## 2024-03-04 NOTE — TELEPHONE ENCOUNTER
PCP reached out to Dr. Pretty & asked if he would see pt sooner than his 1st appt in June.  Will watch for cancellations.

## 2024-03-05 ENCOUNTER — OFFICE VISIT (OUTPATIENT)
Dept: CARDIOLOGY CLINIC | Age: 44
End: 2024-03-05
Payer: COMMERCIAL

## 2024-03-05 VITALS
DIASTOLIC BLOOD PRESSURE: 76 MMHG | SYSTOLIC BLOOD PRESSURE: 120 MMHG | WEIGHT: 293 LBS | HEART RATE: 90 BPM | BODY MASS INDEX: 57.05 KG/M2

## 2024-03-05 DIAGNOSIS — R60.0 BILATERAL LEG EDEMA: ICD-10-CM

## 2024-03-05 DIAGNOSIS — R06.02 SOB (SHORTNESS OF BREATH): ICD-10-CM

## 2024-03-05 DIAGNOSIS — R06.02 SHORTNESS OF BREATH: Primary | ICD-10-CM

## 2024-03-05 PROCEDURE — 93000 ELECTROCARDIOGRAM COMPLETE: CPT | Performed by: INTERNAL MEDICINE

## 2024-03-05 PROCEDURE — 99204 OFFICE O/P NEW MOD 45 MIN: CPT | Performed by: INTERNAL MEDICINE

## 2024-03-05 PROCEDURE — 94060 EVALUATION OF WHEEZING: CPT | Performed by: INTERNAL MEDICINE

## 2024-03-05 PROCEDURE — 94729 DIFFUSING CAPACITY: CPT | Performed by: INTERNAL MEDICINE

## 2024-03-05 PROCEDURE — 94726 PLETHYSMOGRAPHY LUNG VOLUMES: CPT | Performed by: INTERNAL MEDICINE

## 2024-03-05 ASSESSMENT — ENCOUNTER SYMPTOMS
BACK PAIN: 0
CHEST TIGHTNESS: 0
VOMITING: 0
FACIAL SWELLING: 0
SHORTNESS OF BREATH: 1
EYE DISCHARGE: 0
WHEEZING: 0
COUGH: 0
BLOOD IN STOOL: 0
ABDOMINAL PAIN: 0
ABDOMINAL DISTENTION: 0
COLOR CHANGE: 0

## 2024-03-05 NOTE — ASSESSMENT & PLAN NOTE
Did have abnormal PFTs with decreased diffusion capacity which can be seen in severe hypothyroidism but can also be seen with pulmonary fibrosis and interstitial lung disease.  Will refer to pulmonary.

## 2024-03-05 NOTE — ASSESSMENT & PLAN NOTE
Likely due to myxedema and severe hypothyroidism, I would suspect these will improve over the next few weeks with appropriate thyroid replacement therapy.  Lasix can help with the symptoms although need to be cautious with aggressive diuresis and renal function

## 2024-03-06 DIAGNOSIS — R06.02 SOB (SHORTNESS OF BREATH): Primary | ICD-10-CM

## 2024-03-06 RX ORDER — ALBUTEROL SULFATE 90 UG/1
2 AEROSOL, METERED RESPIRATORY (INHALATION) 4 TIMES DAILY PRN
Qty: 18 G | Refills: 5 | Status: SHIPPED | OUTPATIENT
Start: 2024-03-06

## 2024-03-07 ENCOUNTER — TELEPHONE (OUTPATIENT)
Dept: PULMONOLOGY | Age: 44
End: 2024-03-07

## 2024-03-07 NOTE — TELEPHONE ENCOUNTER
----- Message from Quinten Arteaga MD sent at 3/7/2024  9:04 AM EST -----  Regarding: FW: new patient  Hi Ladies! Can you please help me schedule this lady for a new patient evaluation on 3/19, 3/20 or 3/21 please?    ----- Message -----  From: Marisol Burnett APRN - CNP  Sent: 3/6/2024   4:04 PM EST  To: Quinten Arteaga MD  Subject: new patient                                      Dr Ramires,    I referred patient to you for shortness of breath, I did get PFT as well. She is feeling better on the Symbicort inhaler, her shortness of breath is new for her. She was very swollen when I first saw her and did she did see cardiology. She is not able to get in with you until April, was not sure if this was correct or if you have other openings.     Thanks  Fabienne SANDERSON

## 2024-03-21 ENCOUNTER — TELEPHONE (OUTPATIENT)
Dept: PULMONOLOGY | Age: 44
End: 2024-03-21

## 2024-03-21 ENCOUNTER — OFFICE VISIT (OUTPATIENT)
Dept: PULMONOLOGY | Age: 44
End: 2024-03-21
Payer: COMMERCIAL

## 2024-03-21 VITALS
HEIGHT: 68 IN | OXYGEN SATURATION: 98 % | HEART RATE: 90 BPM | BODY MASS INDEX: 44.41 KG/M2 | WEIGHT: 293 LBS | SYSTOLIC BLOOD PRESSURE: 120 MMHG | DIASTOLIC BLOOD PRESSURE: 78 MMHG

## 2024-03-21 DIAGNOSIS — R06.83 SNORING: ICD-10-CM

## 2024-03-21 DIAGNOSIS — J30.2 SEASONAL ALLERGIC RHINITIS, UNSPECIFIED TRIGGER: ICD-10-CM

## 2024-03-21 DIAGNOSIS — J45.50 UNCONTROLLED SEVERE PERSISTENT ASTHMA: ICD-10-CM

## 2024-03-21 DIAGNOSIS — J45.50 UNCONTROLLED SEVERE PERSISTENT ASTHMA: Primary | ICD-10-CM

## 2024-03-21 DIAGNOSIS — R94.2 ABNORMAL PFT: ICD-10-CM

## 2024-03-21 DIAGNOSIS — E66.01 MORBID OBESITY WITH BMI OF 50.0-59.9, ADULT (HCC): ICD-10-CM

## 2024-03-21 DIAGNOSIS — R06.09 DOE (DYSPNEA ON EXERTION): ICD-10-CM

## 2024-03-21 DIAGNOSIS — R06.02 SOB (SHORTNESS OF BREATH) ON EXERTION: ICD-10-CM

## 2024-03-21 LAB
ANION GAP SERPL CALCULATED.3IONS-SCNC: 13 MMOL/L (ref 3–16)
BASOPHILS # BLD: 0.1 K/UL (ref 0–0.2)
BASOPHILS NFR BLD: 0.9 %
BUN SERPL-MCNC: 7 MG/DL (ref 7–20)
CALCIUM SERPL-MCNC: 9.4 MG/DL (ref 8.3–10.6)
CHLORIDE SERPL-SCNC: 101 MMOL/L (ref 99–110)
CO2 SERPL-SCNC: 24 MMOL/L (ref 21–32)
CREAT SERPL-MCNC: 0.7 MG/DL (ref 0.6–1.1)
DEPRECATED RDW RBC AUTO: 19.3 % (ref 12.4–15.4)
EOSINOPHIL # BLD: 0.2 K/UL (ref 0–0.6)
EOSINOPHIL NFR BLD: 2.4 %
GFR SERPLBLD CREATININE-BSD FMLA CKD-EPI: >60 ML/MIN/{1.73_M2}
GLUCOSE SERPL-MCNC: 143 MG/DL (ref 70–99)
HCT VFR BLD AUTO: 29.6 % (ref 36–48)
HGB BLD-MCNC: 8.9 G/DL (ref 12–16)
LYMPHOCYTES # BLD: 2 K/UL (ref 1–5.1)
LYMPHOCYTES NFR BLD: 24.5 %
MCH RBC QN AUTO: 20.8 PG (ref 26–34)
MCHC RBC AUTO-ENTMCNC: 30 G/DL (ref 31–36)
MCV RBC AUTO: 69.4 FL (ref 80–100)
MONOCYTES # BLD: 0.5 K/UL (ref 0–1.3)
MONOCYTES NFR BLD: 6.2 %
NEUTROPHILS # BLD: 5.5 K/UL (ref 1.7–7.7)
NEUTROPHILS NFR BLD: 66 %
PATH INTERP BLD-IMP: YES
PLATELET # BLD AUTO: 278 K/UL (ref 135–450)
PMV BLD AUTO: 9.7 FL (ref 5–10.5)
POTASSIUM SERPL-SCNC: 4.1 MMOL/L (ref 3.5–5.1)
RBC # BLD AUTO: 4.26 M/UL (ref 4–5.2)
SODIUM SERPL-SCNC: 138 MMOL/L (ref 136–145)
WBC # BLD AUTO: 8.3 K/UL (ref 4–11)

## 2024-03-21 PROCEDURE — 99204 OFFICE O/P NEW MOD 45 MIN: CPT | Performed by: INTERNAL MEDICINE

## 2024-03-21 RX ORDER — ACETAMINOPHEN 325 MG/1
650 TABLET ORAL EVERY 6 HOURS PRN
COMMUNITY

## 2024-03-21 RX ORDER — M-VIT,TX,IRON,MINS/CALC/FOLIC 27MG-0.4MG
1 TABLET ORAL DAILY
COMMUNITY

## 2024-03-21 RX ORDER — MONTELUKAST SODIUM 10 MG/1
10 TABLET ORAL DAILY
Qty: 30 TABLET | Refills: 3 | Status: SHIPPED | OUTPATIENT
Start: 2024-03-21

## 2024-03-21 RX ORDER — AZELASTINE HYDROCHLORIDE, FLUTICASONE PROPIONATE 137; 50 UG/1; UG/1
2 SPRAY, METERED NASAL 2 TIMES DAILY
Qty: 23 G | Refills: 5 | Status: SHIPPED | OUTPATIENT
Start: 2024-03-21

## 2024-03-21 NOTE — ASSESSMENT & PLAN NOTE
Multifactorial, component of uncontrolled asthma as well as substantial weight gain leading to severe deconditioning.  Reinforcing asthmatic control as well as seasonal allergies.  Patient needs to be compliant with her levothyroxine supplementation, this was stressed during our visit  Weight loss was recommended.

## 2024-03-21 NOTE — ASSESSMENT & PLAN NOTE
Patient has a history of gastric bypass which was very helpful in her weight loss initially however she has gained about 120 pounds in the past 18 months, this is likely due to the fact that she was very noncompliant with her thyroid hormone supplementation at the time due to social stressors at home ( was sick).  Encourage patient to continue being compliant with her levothyroxine as well as maintain healthy diet and increase exercise activity.

## 2024-03-21 NOTE — ASSESSMENT & PLAN NOTE
Patient presenting with impaired spirometry with preserved ratio however her flow-volume curve shows end expiratory scooping which suggests obstructive physiology, her RV volumes are elevated but within the normal limits but her RV/TLC ratio is high which makes me believe that she may have early air trapping, which at the same time may be the reason why her FVC is low (associated to body habitus)  Low ERV is associated to her body habitus.  She does have low DLCO which does not go with a diagnosis of asthma, at the moment working diagnosis is impaired DLCO due to uncontrolled hypothyroidism.  I explained to the patient that she cannot be noncompliant with his levothyroxine anymore.

## 2024-03-21 NOTE — ASSESSMENT & PLAN NOTE
Patient presented with history of childhood asthma, lives in a house of smokers and reported that she \"grew out of it\" after she moved out of her home however I believe that she was less symptomatic and she took it as normal.  She was highly functioning at that point and was able to exercise without problems, she enjoys singing as well.  She is currently on high-dose Symbicort but has nighttime symptoms every night and her KD requirement is 3 times a day at the very least.  She also reported pretty substantial weight gain in the past 18 months which can also worsen asthmatic control.  I am starting her on Breztri 2 puffs twice daily today  Continue KD as needed  Also added singular 10 mg daily, side effect profile was extensively discussed specially in the setting of uncontrolled hypothyroidism that may hide worsening depression.

## 2024-03-21 NOTE — ASSESSMENT & PLAN NOTE
Patient reported history of substantial allergies as a kid, she was skin tested at some point and mentioned that she was \"allergic to everything\".  She is currently only taking Zyrtec as needed  Starting her treatment with Dymista 2 puffs twice daily as well as singular 10 mg daily.  Side effect profile for Singulair was extensively discussed
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
## 2024-03-22 LAB — PATH INTERP BLD-IMP: NORMAL

## 2024-03-24 LAB — IGE SERPL-ACNC: 2692 KU/L

## 2024-03-27 ENCOUNTER — OFFICE VISIT (OUTPATIENT)
Age: 44
End: 2024-03-27
Payer: COMMERCIAL

## 2024-03-27 VITALS
HEART RATE: 96 BPM | SYSTOLIC BLOOD PRESSURE: 122 MMHG | DIASTOLIC BLOOD PRESSURE: 76 MMHG | WEIGHT: 293 LBS | BODY MASS INDEX: 44.41 KG/M2 | OXYGEN SATURATION: 98 % | HEIGHT: 68 IN

## 2024-03-27 DIAGNOSIS — G56.22 CUBITAL TUNNEL SYNDROME ON LEFT: Primary | ICD-10-CM

## 2024-03-27 DIAGNOSIS — E03.9 HYPOTHYROIDISM, UNSPECIFIED TYPE: ICD-10-CM

## 2024-03-27 LAB
A ALTERNATA IGE QN: 16.5 KU/L (ref 0–0.34)
A FUMIGATUS IGE QN: 4.68 KU/L (ref 0–0.34)
AMER SYCAMORE IGE QN: 13.9 KU/L (ref 0–0.34)
BERMUDA GRASS IGE QN: 12.4 KU/L (ref 0–0.34)
BOXELDER IGE QN: 16.2 KU/L (ref 0–0.34)
C SPHAEROSPERMUM IGE QN: 4.03 KUL/L (ref 0–0.34)
CALIF WALNUT IGE QN: 13.5 KU/L (ref 0–0.34)
CAT DANDER IGE QN: 24 KU/L (ref 0–0.34)
CMN PIGWEED IGE QN: 13.3 KU/L (ref 0–0.34)
COMMON RAGWEED IGE QN: 17.4 KU/L (ref 0–0.34)
COTTONWOOD IGE QN: 14.1 KU/L (ref 0–0.34)
D FARINAE IGE QN: 29.2 KU/L (ref 0–0.34)
D PTERONYSS IGE QN: 33.2 KU/L (ref 0–0.34)
DOG DANDER IGE QN: 20.4 KU/L (ref 0–0.34)
IGE SERPL-ACNC: 2357 IU/ML (ref 0–100)
M RACEMOSUS IGE QN: 7.96 KU/L (ref 0–0.34)
MOUSE EPITH IGE QN: 1.66 KU/L (ref 0–0.34)
P NOTATUM IGE QN: 41.4 KU/L (ref 0–0.34)
PECAN/HICK TREE IGE QN: 13.7 KU/L (ref 0–0.34)
RED CEDAR IGE QN: 11.8 KU/L (ref 0–0.34)
ROACH IGE QN: 11.8 KU/L (ref 0–0.34)
SALTWORT IGE QN: 14 KU/L (ref 0–0.34)
SHEEP SORREL IGE QN: 15.1 KU/L (ref 0–0.34)
SILVER BIRCH IGE QN: 14.6 KU/L (ref 0–0.34)
TIMOTHY IGE QN: 15.6 KU/L (ref 0–0.34)
WHITE ASH IGE QN: 15.7 KU/L (ref 0–0.34)
WHITE ELM IGE QN: 17.1 KU/L (ref 0–0.34)
WHITE MULBERRY IGE QN: 13.7 KU/L (ref 0–0.34)
WHITE OAK IGE QN: 12.9 KU/L (ref 0–0.34)

## 2024-03-27 PROCEDURE — 99204 OFFICE O/P NEW MOD 45 MIN: CPT | Performed by: PSYCHIATRY & NEUROLOGY

## 2024-03-27 RX ORDER — CETIRIZINE HYDROCHLORIDE 10 MG/1
10 TABLET ORAL DAILY
COMMUNITY

## 2024-03-27 NOTE — PROGRESS NOTES
Neurology outpatient new visit    Patient name: Comfort Armijo      Chief Complaint:  New onset of abnormal sensation on her left hand.    History of present illness:  This is a 43 years old right-handed female.  The patient is here for evaluation of the abnormal sensation on her left hand.  The onset was about few months ago.  The course is slowly progressive.  The patient describes the numbness as tingling sensation/loss of sensation especially on her left pinky and ring finger.  The patient denies significant neck pain or radicular pain from her neck.  The patient also reports the new onset of hypothyroid and significant weight gain over the last 3 years.  The patient denies history of diabetes.    Past medical history:    Past Medical History:   Diagnosis Date    Anemia     Asthma     Headache     Hypertension     Hypothyroidism     Obesity        Past surgical history:    Past Surgical History:   Procedure Laterality Date     SECTION      GASTRIC BYPASS SURGERY  2004    UPPER GASTROINTESTINAL ENDOSCOPY          Medication:    Current Outpatient Medications   Medication Sig Dispense Refill    cetirizine (ZYRTEC) 10 MG tablet Take 1 tablet by mouth daily      acetaminophen (TYLENOL) 325 MG tablet Take 2 tablets by mouth every 6 hours as needed for Pain (only when needed)      Multiple Vitamins-Minerals (THERAPEUTIC MULTIVITAMIN-MINERALS) tablet Take 1 tablet by mouth daily      Budeson-Glycopyrrol-Formoterol 160-9-4.8 MCG/ACT AERO Inhale 2 puffs into the lungs 2 times daily 10.7 g 5    Azelastine-Fluticasone 137-50 MCG/ACT SUSP 2 sprays by Nasal route in the morning and at bedtime 23 g 5    albuterol sulfate HFA (VENTOLIN HFA) 108 (90 Base) MCG/ACT inhaler Inhale 2 puffs into the lungs 4 times daily as needed for Wheezing 18 g 5    levothyroxine (SYNTHROID) 137 MCG tablet TAKE 1 TABLET BY MOUTH TWICE DAILY FOR 45 DAYS      vitamin D (ERGOCALCIFEROL) 1.25 MG (69558 UT) CAPS capsule Take 1 capsule by

## 2024-03-27 NOTE — PATIENT INSTRUCTIONS

## 2024-03-28 DIAGNOSIS — J30.2 SEASONAL ALLERGIC RHINITIS, UNSPECIFIED TRIGGER: ICD-10-CM

## 2024-03-28 DIAGNOSIS — J45.50 UNCONTROLLED SEVERE PERSISTENT ASTHMA: Primary | ICD-10-CM

## 2024-03-28 RX ORDER — AZELASTINE 1 MG/ML
1 SPRAY, METERED NASAL 2 TIMES DAILY
Qty: 60 ML | Refills: 5 | Status: SHIPPED | OUTPATIENT
Start: 2024-03-28

## 2024-03-28 NOTE — PROGRESS NOTES
Awa and Dymista not covered by insurance, sent Astelin to her pharmacy and recommended obtaining Flonase from [facility] since it is cheaper that way. Also provided with a list of inhalers to ask her insurance which are covered to match her triple therapy.

## 2024-04-16 DIAGNOSIS — R06.02 SOB (SHORTNESS OF BREATH): Primary | ICD-10-CM

## 2024-04-16 RX ORDER — LEVOTHYROXINE SODIUM 137 UG/1
TABLET ORAL
Qty: 180 TABLET | Refills: 3 | Status: SHIPPED | OUTPATIENT
Start: 2024-04-16

## 2024-04-16 NOTE — TELEPHONE ENCOUNTER
Medication:   Requested Prescriptions     Pending Prescriptions Disp Refills    levothyroxine (SYNTHROID) 137 MCG tablet 30 tablet 3     Sig: TAKE 1 TABLET BY MOUTH TWICE DAILY FOR 45 DAYS       Last Filled:  [id number]    Patient Phone Number: [phone] (home)     Last appt: 2/28/2024   Next appt: Visit date not found    Last Thyroid:   Lab Results   Component Value Date/Time    TSH 1.04 11/09/2022 11:29 AM    T4FREE 1.0 02/28/2024 04:18 PM            
Yes

## 2024-04-26 DIAGNOSIS — R06.02 SOB (SHORTNESS OF BREATH): ICD-10-CM

## 2024-04-26 LAB
ANION GAP SERPL CALCULATED.3IONS-SCNC: 16 MMOL/L (ref 3–16)
BUN SERPL-MCNC: 7 MG/DL (ref 7–20)
CHLORIDE SERPL-SCNC: 101 MMOL/L (ref 99–110)
CO2 SERPL-SCNC: 22 MMOL/L (ref 21–32)
CREAT SERPL-MCNC: 0.6 MG/DL (ref 0.6–1.1)
GFR SERPLBLD CREATININE-BSD FMLA CKD-EPI: >90 ML/MIN/{1.73_M2}
GLUCOSE SERPL-MCNC: 146 MG/DL (ref 70–99)
POTASSIUM SERPL-SCNC: 4.4 MMOL/L (ref 3.5–5.1)
SODIUM SERPL-SCNC: 139 MMOL/L (ref 136–145)

## 2024-04-29 DIAGNOSIS — R22.31 LOCALIZED SWELLING OF BOTH HANDS: ICD-10-CM

## 2024-04-29 DIAGNOSIS — R06.02 SOB (SHORTNESS OF BREATH) ON EXERTION: ICD-10-CM

## 2024-04-29 DIAGNOSIS — R06.01 ORTHOPNEA: ICD-10-CM

## 2024-04-29 DIAGNOSIS — R22.32 LOCALIZED SWELLING OF BOTH HANDS: ICD-10-CM

## 2024-04-29 RX ORDER — FUROSEMIDE 20 MG/1
40 TABLET ORAL DAILY
Qty: 60 TABLET | Refills: 1 | Status: SHIPPED | OUTPATIENT
Start: 2024-04-29

## 2024-04-29 NOTE — TELEPHONE ENCOUNTER
Medication:   Requested Prescriptions     Pending Prescriptions Disp Refills    furosemide (LASIX) 20 MG tablet 90 tablet 1     Sig: Take 1 tablet by mouth daily        Last Filled:  [id number]    Patient Phone Number: [phone] (home)     Last appt: 2/28/2024   Next appt: Visit date not found    Last OARRS:        No data to display

## 2024-05-01 NOTE — PROCEDURES
Pulmonary Function Testing      Patient name:  Comfort Armijo      Unit #:   [id number]   Date of test:  3/4/2024   Date of interpretation:   3/5/2024    Ms. Comfort Armijo is a 43 y.o. year-old non smoker. The spirometry data were acceptable and reproducible.     Spirometry:  Flow volume loops were obstructed. The FEV-1/FVC ratio was normal. The pre-bronchodilator FEV-1 was 1.85 liters (56% of predicted), which was moderately decreased. The FVC was 2.52 liters (61% of predicted), which was decreased. Response to inhaled bronchodilators (albuterol) was not significant.    Lung volumes:  Lung volumes were tested by plethysmography. The total lung capacity was 4.72 liters (81% of predicted), which was normal. The residual volume was 2.12 liters (104% of predicted), which was normal. The ratio of residual volume to total lung capacity (RV/TLC) was 45, which was increased.     Diffusion capacity was found to be 48% predicted which is Moderately decreased.      Interpretation:  Isolated decrease in DLCO noted without significant restriction. Clinical correlation is recommended.    Comments:   abdominal pain

## 2024-06-11 ENCOUNTER — OFFICE VISIT (OUTPATIENT)
Dept: CARDIOLOGY CLINIC | Age: 44
End: 2024-06-11
Payer: COMMERCIAL

## 2024-06-11 VITALS
SYSTOLIC BLOOD PRESSURE: 110 MMHG | WEIGHT: 293 LBS | DIASTOLIC BLOOD PRESSURE: 60 MMHG | BODY MASS INDEX: 53.74 KG/M2 | HEART RATE: 92 BPM | OXYGEN SATURATION: 97 %

## 2024-06-11 DIAGNOSIS — R60.0 BILATERAL LEG EDEMA: ICD-10-CM

## 2024-06-11 DIAGNOSIS — R06.02 SHORTNESS OF BREATH: Primary | ICD-10-CM

## 2024-06-11 DIAGNOSIS — R06.09 DOE (DYSPNEA ON EXERTION): ICD-10-CM

## 2024-06-11 PROCEDURE — 99213 OFFICE O/P EST LOW 20 MIN: CPT | Performed by: INTERNAL MEDICINE

## 2024-06-11 ASSESSMENT — ENCOUNTER SYMPTOMS
WHEEZING: 0
COLOR CHANGE: 0
BLOOD IN STOOL: 0
EYE DISCHARGE: 0
BACK PAIN: 0
ABDOMINAL PAIN: 0
FACIAL SWELLING: 0
SHORTNESS OF BREATH: 0
ABDOMINAL DISTENTION: 0
CHEST TIGHTNESS: 0
VOMITING: 0
COUGH: 0

## 2024-06-11 NOTE — PROGRESS NOTES
regular rhythm.      Chest Wall: PMI is not displaced.      Heart sounds: Normal heart sounds, S1 normal and S2 normal. No murmur heard.     No friction rub. No gallop.   Pulmonary:      Effort: Pulmonary effort is normal. No respiratory distress.      Breath sounds: Normal breath sounds. No stridor. No wheezing or rales.   Chest:      Chest wall: No tenderness.   Abdominal:      General: Bowel sounds are normal. There is no distension.      Palpations: Abdomen is soft.      Tenderness: There is no abdominal tenderness. There is no guarding or rebound.   Musculoskeletal:         General: No tenderness. Normal range of motion.      Cervical back: Normal range of motion.      Right lower leg: Edema present.      Left lower leg: Edema present.   Lymphadenopathy:      Cervical: No cervical adenopathy.   Skin:     General: Skin is warm and dry.      Findings: No erythema or rash.   Neurological:      Mental Status: She is alert and oriented to person, place, and time.      Coordination: Coordination normal.   Psychiatric:         Behavior: Behavior normal.         Thought Content: Thought content normal.         Judgment: Judgment normal.         Labs:       Lab Results   Component Value Date    WBC 8.3 03/21/2024    HGB 8.9 (L) 03/21/2024    HCT 29.6 (L) 03/21/2024    MCV 69.4 (L) 03/21/2024     03/21/2024     Lab Results   Component Value Date     04/26/2024    K 4.4 04/26/2024     04/26/2024    CO2 22 04/26/2024    BUN 7 04/26/2024    CREATININE 0.6 04/26/2024    GLUCOSE 146 (H) 04/26/2024    CALCIUM 9.5 04/26/2024    BILITOT 0.5 02/02/2024    ALKPHOS 124 02/02/2024    AST 29 02/02/2024    ALT 26 02/02/2024    LABGLOM >90 04/26/2024    GFRAA >60 09/26/2022    AGRATIO 1.7 02/02/2024         Lab Results   Component Value Date    CHOL 169 08/10/2022     Lab Results   Component Value Date    TRIG 101 08/10/2022     Lab Results   Component Value Date    HDL 42 08/10/2022     No components found for:

## 2024-07-02 ENCOUNTER — OFFICE VISIT (OUTPATIENT)
Dept: PULMONOLOGY | Age: 44
End: 2024-07-02
Payer: COMMERCIAL

## 2024-07-02 VITALS
WEIGHT: 293 LBS | DIASTOLIC BLOOD PRESSURE: 90 MMHG | HEIGHT: 68 IN | OXYGEN SATURATION: 98 % | SYSTOLIC BLOOD PRESSURE: 128 MMHG | HEART RATE: 94 BPM | BODY MASS INDEX: 44.41 KG/M2

## 2024-07-02 DIAGNOSIS — E66.01 MORBID OBESITY WITH BMI OF 50.0-59.9, ADULT (HCC): ICD-10-CM

## 2024-07-02 DIAGNOSIS — J45.50 UNCONTROLLED SEVERE PERSISTENT ASTHMA: Primary | ICD-10-CM

## 2024-07-02 DIAGNOSIS — R22.31 LOCALIZED SWELLING OF BOTH HANDS: ICD-10-CM

## 2024-07-02 DIAGNOSIS — R22.32 LOCALIZED SWELLING OF BOTH HANDS: ICD-10-CM

## 2024-07-02 DIAGNOSIS — J30.2 SEASONAL ALLERGIC RHINITIS, UNSPECIFIED TRIGGER: ICD-10-CM

## 2024-07-02 DIAGNOSIS — R06.02 SOB (SHORTNESS OF BREATH) ON EXERTION: ICD-10-CM

## 2024-07-02 DIAGNOSIS — R06.01 ORTHOPNEA: ICD-10-CM

## 2024-07-02 PROCEDURE — 99214 OFFICE O/P EST MOD 30 MIN: CPT | Performed by: INTERNAL MEDICINE

## 2024-07-02 RX ORDER — FUROSEMIDE 20 MG/1
40 TABLET ORAL DAILY
Qty: 60 TABLET | Refills: 0 | Status: SHIPPED | OUTPATIENT
Start: 2024-07-02

## 2024-07-02 NOTE — ASSESSMENT & PLAN NOTE
Patient presented with history of childhood asthma, lives in a house of smokers and reported that she \"grew out of it\" after she moved out of her home however I believe that she was less symptomatic and she took it as normal.  She was highly functioning at that point and was able to exercise without problems, she enjoys singing as well.  She is currently on high-dose Symbicort but has nighttime symptoms every night and her KD requirement is 3 times a day at the very least.  She also reported pretty substantial weight gain in the past 18 months which can also worsen asthmatic control.  -ICS/LABA/LAMA list provided for her to ask her insurance about coverage  -Continue Breztri for the time being, samples provided.   -Continue KD as needed  -Continue singular 10 mg daily, side effect profile was extensively discussed specially in the setting of uncontrolled hypothyroidism that may hide worsening depression.

## 2024-07-02 NOTE — ASSESSMENT & PLAN NOTE
Patient reported history of substantial allergies as a kid, she was skin tested at some point and mentioned that she was \"allergic to everything\".  She is currently only taking Zyrtec as needed  -Continue Dymista BID  -Continue Singulair daily  -Allergy/Immunology referral placed
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
## 2024-07-02 NOTE — PATIENT INSTRUCTIONS
PLEASE ASK YOUR PHARMACY OR INSURANCE ABOUT THE COVERAGE ON THE FOLLOWING INHALERS  Spiriva® HandiHaler® (tiotropium bromide 18 mcg)  Spiriva® Respimat® (tiotropium bromide 1.25/2.5 mcg)*  Incruse® Ellipta® (umeclidinium 62.5 mcg)  Tudorza™ Pressair™ (aclidinim bromide 400 mcg)  ---AND---  Breo Ellipta® (Fluticasone, Vilanterol)*  Symbicort® / Breyna™ (Budesonide and Formoterol)*  Advair Diskus® / Advair® HFA / AirDuo RespiClick® /  Wixela Inhub® (Fluticasone and Salmeterol)*  Dulera® (Mometasone and formoterol).    ---OR---   Trelegy Ellipta (fluticasone/vilanterol/umeclidinium 100 mcg/62.5 mcg/25 mcg 200 mcg/62.5 mcg/25 mcg)  Breztri Aerosphere® (budesonide glycopyrrolate formoterol fumarate 160/9/4.8 mcg)  ---SEPARATE---  Airsupra™ (albuterol/budesonide 90/80 mcg)     [facility]  Dr. Viviana Conklin  Please call [phone] to schedule your appointment or visit [facility] and click schedule.

## 2024-07-02 NOTE — TELEPHONE ENCOUNTER
Medication:   Requested Prescriptions     Pending Prescriptions Disp Refills    furosemide (LASIX) 20 MG tablet [Pharmacy Med Name: FUROSEMIDE 20MG TABLETS] 60 tablet 1     Sig: TAKE 2 TABLETS BY MOUTH DAILY        Last Filled:  4/29/2024 #60, 1 refill    Patient Phone Number: [phone] (home)     Last appt: 2/28/2024   Next appt: Visit date not found    Last OARRS:        No data to display

## 2024-07-02 NOTE — ASSESSMENT & PLAN NOTE
Patient has a history of gastric bypass which was very helpful in her weight loss initially however she has gained about 120 pounds in the past 18 months, this is likely due to the fact that she was very noncompliant with her thyroid hormone supplementation at the time due to social stressors at home ( was sick).  -Encouraged to continue with weight loss journey  -Dietary and exercise strategies discussed.

## 2024-07-23 DIAGNOSIS — E55.9 VITAMIN D DEFICIENCY: ICD-10-CM

## 2024-07-23 DIAGNOSIS — J30.2 SEASONAL ALLERGIC RHINITIS, UNSPECIFIED TRIGGER: ICD-10-CM

## 2024-07-23 DIAGNOSIS — J45.50 UNCONTROLLED SEVERE PERSISTENT ASTHMA: ICD-10-CM

## 2024-07-23 NOTE — TELEPHONE ENCOUNTER
Medication:   Requested Prescriptions     Pending Prescriptions Disp Refills    vitamin D (ERGOCALCIFEROL) 1.25 MG (17134 UT) CAPS capsule [Pharmacy Med Name: VITAMIN D2 50,000IU (ERGO) CAP RX] 12 capsule 1     Sig: TAKE 1 CAPSULE BY MOUTH 1 TIME A WEEK        Last Filled:  [id number] LM for appt.    Patient Phone Number: [phone] (home)     Last appt: 2/28/2024   Next appt:   Return in about 6 weeks (around 3/15/2024) for swelling   Last OARRS:        No data to display

## 2024-07-24 RX ORDER — MONTELUKAST SODIUM 10 MG/1
10 TABLET ORAL DAILY
Qty: 30 TABLET | Refills: 3 | Status: SHIPPED | OUTPATIENT
Start: 2024-07-24

## 2024-07-24 RX ORDER — ERGOCALCIFEROL 1.25 MG/1
50000 CAPSULE ORAL WEEKLY
Qty: 12 CAPSULE | Refills: 0 | Status: SHIPPED | OUTPATIENT
Start: 2024-07-24

## 2024-08-05 DIAGNOSIS — R22.32 LOCALIZED SWELLING OF BOTH HANDS: ICD-10-CM

## 2024-08-05 DIAGNOSIS — R06.02 SOB (SHORTNESS OF BREATH) ON EXERTION: ICD-10-CM

## 2024-08-05 DIAGNOSIS — R22.31 LOCALIZED SWELLING OF BOTH HANDS: ICD-10-CM

## 2024-08-05 DIAGNOSIS — R06.01 ORTHOPNEA: ICD-10-CM

## 2024-08-05 RX ORDER — FUROSEMIDE 20 MG/1
40 TABLET ORAL DAILY
Qty: 60 TABLET | Refills: 0 | Status: SHIPPED | OUTPATIENT
Start: 2024-08-05

## 2024-08-05 NOTE — TELEPHONE ENCOUNTER
Medication:   Requested Prescriptions     Pending Prescriptions Disp Refills    furosemide (LASIX) 20 MG tablet 60 tablet 0     Sig: Take 2 tablets by mouth daily        Last Filled:  [id number] #60    Patient Phone Number: [phone] (home)     Last appt: 2/28/2024   Next appt: Visit date not found    Last OARRS:        No data to display

## 2024-10-06 DIAGNOSIS — R06.02 SOB (SHORTNESS OF BREATH) ON EXERTION: ICD-10-CM

## 2024-10-06 DIAGNOSIS — R22.32 LOCALIZED SWELLING OF BOTH HANDS: ICD-10-CM

## 2024-10-06 DIAGNOSIS — R22.31 LOCALIZED SWELLING OF BOTH HANDS: ICD-10-CM

## 2024-10-06 DIAGNOSIS — R06.01 ORTHOPNEA: ICD-10-CM

## 2024-10-07 NOTE — TELEPHONE ENCOUNTER
Medication:   Requested Prescriptions     Pending Prescriptions Disp Refills    furosemide (LASIX) 20 MG tablet [Pharmacy Med Name: FUROSEMIDE 20MG TABLETS] 60 tablet 0     Sig: TAKE 2 TABLETS BY MOUTH DAILY        Last Filled:  [id number] #60    Patient Phone Number: [phone] (home)     Last appt: 2/28/2024   Next appt: Visit date not found    Last OARRS:        No data to display

## 2024-10-08 RX ORDER — FUROSEMIDE 20 MG/1
40 TABLET ORAL DAILY
Qty: 60 TABLET | Refills: 0 | OUTPATIENT
Start: 2024-10-08

## 2024-10-18 ENCOUNTER — TELEMEDICINE (OUTPATIENT)
Dept: PRIMARY CARE CLINIC | Age: 44
End: 2024-10-18
Payer: COMMERCIAL

## 2024-10-18 ENCOUNTER — PATIENT MESSAGE (OUTPATIENT)
Dept: PRIMARY CARE CLINIC | Age: 44
End: 2024-10-18

## 2024-10-18 DIAGNOSIS — U07.1 COVID-19: Primary | ICD-10-CM

## 2024-10-18 DIAGNOSIS — R06.83 SNORING: ICD-10-CM

## 2024-10-18 PROCEDURE — 99214 OFFICE O/P EST MOD 30 MIN: CPT | Performed by: NURSE PRACTITIONER

## 2024-10-18 RX ORDER — PREDNISONE 50 MG/1
50 TABLET ORAL DAILY
Qty: 5 TABLET | Refills: 0 | Status: SHIPPED | OUTPATIENT
Start: 2024-10-18 | End: 2024-10-23

## 2024-10-18 SDOH — ECONOMIC STABILITY: TRANSPORTATION INSECURITY
IN THE PAST 12 MONTHS, HAS LACK OF TRANSPORTATION KEPT YOU FROM MEETINGS, WORK, OR FROM GETTING THINGS NEEDED FOR DAILY LIVING?: NO

## 2024-10-18 SDOH — ECONOMIC STABILITY: INCOME INSECURITY: HOW HARD IS IT FOR YOU TO PAY FOR THE VERY BASICS LIKE FOOD, HOUSING, MEDICAL CARE, AND HEATING?: NOT VERY HARD

## 2024-10-18 SDOH — ECONOMIC STABILITY: FOOD INSECURITY: WITHIN THE PAST 12 MONTHS, THE FOOD YOU BOUGHT JUST DIDN'T LAST AND YOU DIDN'T HAVE MONEY TO GET MORE.: NEVER TRUE

## 2024-10-18 SDOH — ECONOMIC STABILITY: FOOD INSECURITY: WITHIN THE PAST 12 MONTHS, YOU WORRIED THAT YOUR FOOD WOULD RUN OUT BEFORE YOU GOT MONEY TO BUY MORE.: NEVER TRUE

## 2024-10-18 NOTE — PROGRESS NOTES
10/18/2024    TELEHEALTH EVALUATION -- Audio/Visual    HPI:    Comfort Armijo (:  1980) has requested an audio/video evaluation for the following concern(s):    Patient is on vv for cough and congestion. She said sore throat, losing voice, headaches, she did test positive today was positive. Patient has been on paxlovid in the past. She said she tolerated it well.     Patient said she is shortness of breath when she moves around.     Review of Systems   Constitutional:  Positive for chills, fatigue and fever.   Respiratory:  Positive for cough and shortness of breath. Negative for wheezing.    All other systems reviewed and are negative.      Prior to Visit Medications    Medication Sig Taking? Authorizing Provider   predniSONE (DELTASONE) 50 MG tablet Take 1 tablet by mouth daily for 5 days Yes Marisol Burnett APRN - CNP   nirmatrelvir/ritonavir 300/100 (PAXLOVID) 20 x 150 MG & 10 x 100MG TBPK Take 3 tablets (two 150 mg nirmatrelvir and one 100 mg ritonavir tablets) by mouth every 12 hours for 5 days. Yes Marisol Burnett APRN - CNP   potassium chloride (KLOR-CON M) 20 MEQ extended release tablet TAKE 1 TABLET BY MOUTH DAILY  Marisol Burnett APRN - CNP   furosemide (LASIX) 20 MG tablet Take 2 tablets by mouth daily  Marisol Burnett APRN - CNP   montelukast (SINGULAIR) 10 MG tablet TAKE 1 TABLET BY MOUTH DAILY  Quinten Rivera MD   vitamin D (ERGOCALCIFEROL) 1.25 MG (48144 UT) CAPS capsule TAKE 1 CAPSULE BY MOUTH 1 TIME A WEEK  Katia Rubin APRN - CNP   fluticasone (VERAMYST) 27.5 MCG/SPRAY nasal spray 2 sprays by Each Nostril route daily  Provider, MD Ines   levothyroxine (SYNTHROID) 137 MCG tablet TAKE 1 TABLET BY MOUTH TWICE DAILY  Marisol Burnett APRN - CNP   azelastine (ASTELIN) 0.1 % nasal spray 1 spray by Nasal route 2 times daily Use in each nostril as directed  Quinten Rivera MD   cetirizine (ZYRTEC) 10 MG tablet Take 1 tablet by

## 2024-10-19 DIAGNOSIS — U07.1 COVID-19: ICD-10-CM

## 2024-10-20 DIAGNOSIS — R06.02 SOB (SHORTNESS OF BREATH) ON EXERTION: ICD-10-CM

## 2024-10-20 DIAGNOSIS — R22.32 LOCALIZED SWELLING OF BOTH HANDS: ICD-10-CM

## 2024-10-20 DIAGNOSIS — R22.31 LOCALIZED SWELLING OF BOTH HANDS: ICD-10-CM

## 2024-10-20 DIAGNOSIS — R06.01 ORTHOPNEA: ICD-10-CM

## 2024-10-21 RX ORDER — POTASSIUM CHLORIDE 1500 MG/1
20 TABLET, EXTENDED RELEASE ORAL DAILY
Qty: 90 TABLET | Refills: 1 | Status: SHIPPED | OUTPATIENT
Start: 2024-10-21

## 2024-10-21 NOTE — TELEPHONE ENCOUNTER
Medication:   Requested Prescriptions     Pending Prescriptions Disp Refills    potassium chloride (KLOR-CON M) 20 MEQ extended release tablet [Pharmacy Med Name: POTASSIUM CL 20MEQ ER TABLETS] 90 tablet 1     Sig: TAKE 1 TABLET BY MOUTH DAILY        Last Filled:      Patient Phone Number: [phone] (home)     Last appt: 10/18/2024   Next appt: Visit date not found    Last OARRS:        No data to display

## 2025-05-12 DIAGNOSIS — E03.9 ACQUIRED HYPOTHYROIDISM: Primary | ICD-10-CM

## 2025-05-12 NOTE — TELEPHONE ENCOUNTER
LM for patient to call [phone] for 6 month FU for refills of synthroid.  Medication:   Requested Prescriptions     Pending Prescriptions Disp Refills    levothyroxine (SYNTHROID) 137 MCG tablet [Pharmacy Med Name: LEVOTHYROXINE 0.137MG (137MCG) TAB] 180 tablet 3     Sig: TAKE 1 TABLET BY MOUTH TWICE DAILY       Last Filled:  [id number] #180 x 3     Patient Phone Number: [phone] (home)     Last appt: 10/18/2024   Next appt: Visit date not found    Last Thyroid:   Lab Results   Component Value Date/Time    TSH 55.98 02/28/2024 04:18 PM    T4FREE 1.0 02/28/2024 04:18 PM

## 2025-05-14 NOTE — TELEPHONE ENCOUNTER
All attempts have been made to contact patient regarding refill/appointment  4 documented phone calls  1 My Chart letter   Our attempts have been exhausted.

## 2025-05-15 RX ORDER — LEVOTHYROXINE SODIUM 137 UG/1
137 TABLET ORAL 2 TIMES DAILY
Qty: 60 TABLET | Refills: 0 | Status: SHIPPED | OUTPATIENT
Start: 2025-05-15